# Patient Record
Sex: FEMALE | Race: WHITE | NOT HISPANIC OR LATINO | Employment: UNEMPLOYED | URBAN - METROPOLITAN AREA
[De-identification: names, ages, dates, MRNs, and addresses within clinical notes are randomized per-mention and may not be internally consistent; named-entity substitution may affect disease eponyms.]

---

## 2021-10-02 ENCOUNTER — HOSPITAL ENCOUNTER (OUTPATIENT)
Dept: MRI IMAGING | Facility: HOSPITAL | Age: 51
Discharge: HOME | End: 2021-10-02
Payer: COMMERCIAL

## 2021-10-02 DIAGNOSIS — M54.2 CERVICALGIA: ICD-10-CM

## 2021-10-02 DIAGNOSIS — M47.812 SPONDYLOSIS WITHOUT MYELOPATHY OR RADICULOPATHY, CERVICAL REGION: ICD-10-CM

## 2021-10-02 PROCEDURE — 72141 MRI NECK SPINE W/O DYE: CPT

## 2023-10-20 ENCOUNTER — OFFICE VISIT (OUTPATIENT)
Dept: GASTROENTEROLOGY | Facility: MEDICAL CENTER | Age: 53
End: 2023-10-20

## 2023-10-20 VITALS
WEIGHT: 143 LBS | SYSTOLIC BLOOD PRESSURE: 113 MMHG | DIASTOLIC BLOOD PRESSURE: 76 MMHG | HEIGHT: 65 IN | BODY MASS INDEX: 23.82 KG/M2 | HEART RATE: 71 BPM | TEMPERATURE: 98.2 F

## 2023-10-20 DIAGNOSIS — R09.A2 GLOBUS SENSATION: ICD-10-CM

## 2023-10-20 DIAGNOSIS — R05.3 CHRONIC COUGH: Primary | ICD-10-CM

## 2023-10-20 DIAGNOSIS — R76.8 HEPATITIS B CORE ANTIBODY POSITIVE: ICD-10-CM

## 2023-10-20 DIAGNOSIS — K21.9 GASTROESOPHAGEAL REFLUX DISEASE WITHOUT ESOPHAGITIS: ICD-10-CM

## 2023-10-20 DIAGNOSIS — K59.00 CONSTIPATION, UNSPECIFIED CONSTIPATION TYPE: ICD-10-CM

## 2023-10-20 PROBLEM — M50.30 DEGENERATIVE DISC DISEASE, CERVICAL: Status: ACTIVE | Noted: 2021-10-06

## 2023-10-20 RX ORDER — METHOCARBAMOL 750 MG/1
TABLET, FILM COATED ORAL
COMMUNITY
Start: 2023-08-04

## 2023-10-20 RX ORDER — PANTOPRAZOLE SODIUM 40 MG/1
40 TABLET, DELAYED RELEASE ORAL
Qty: 90 TABLET | Refills: 3 | Status: SHIPPED | OUTPATIENT
Start: 2023-10-20

## 2023-10-20 RX ORDER — FAMOTIDINE 40 MG/1
40 TABLET, FILM COATED ORAL
Qty: 90 TABLET | Refills: 3 | Status: SHIPPED | OUTPATIENT
Start: 2023-10-20

## 2023-10-20 RX ORDER — VALACYCLOVIR HYDROCHLORIDE 500 MG/1
TABLET, FILM COATED ORAL
COMMUNITY
Start: 2005-11-01

## 2023-10-20 RX ORDER — FLUTICASONE PROPIONATE AND SALMETEROL XINAFOATE 115; 21 UG/1; UG/1
2 AEROSOL, METERED RESPIRATORY (INHALATION) 2 TIMES DAILY
COMMUNITY
Start: 2023-08-05

## 2023-10-20 RX ORDER — ZOLPIDEM TARTRATE 6.25 MG/1
6.25 TABLET, FILM COATED, EXTENDED RELEASE ORAL
COMMUNITY

## 2023-10-20 RX ORDER — LANOLIN ALCOHOL/MO/W.PET/CERES
9 CREAM (GRAM) TOPICAL
COMMUNITY
Start: 2013-12-01

## 2023-10-20 RX ORDER — MONTELUKAST SODIUM 10 MG/1
TABLET ORAL
COMMUNITY

## 2023-10-20 RX ORDER — LORATADINE 10 MG/1
CAPSULE, LIQUID FILLED ORAL
COMMUNITY
Start: 2019-01-01

## 2023-10-20 RX ORDER — PANTOPRAZOLE SODIUM 40 MG/1
1 TABLET, DELAYED RELEASE ORAL DAILY
COMMUNITY
Start: 2023-05-22 | End: 2023-10-20 | Stop reason: SDUPTHER

## 2023-10-20 RX ORDER — GABAPENTIN 300 MG/1
CAPSULE ORAL
COMMUNITY
Start: 2023-10-16

## 2023-10-20 RX ORDER — ZOLMITRIPTAN 5 MG/1
1 SPRAY NASAL
COMMUNITY
Start: 2023-09-28

## 2023-10-20 NOTE — PATIENT INSTRUCTIONS
High Fiber Diet   AMBULATORY CARE:   A high-fiber diet  includes foods that have a high amount of fiber. Fiber is the part of fruits, vegetables, and grains that is not broken down by your body. Fiber keeps your bowel movements regular. Fiber can also help lower your cholesterol level, control blood sugar in people with diabetes, and relieve constipation. Fiber can also help you control your weight because it helps you feel full faster. Most adults should eat 25 to 35 grams of fiber each day. Talk to your dietitian or healthcare provider about the amount of fiber you need. Good sources of fiber:       Foods with at least 4 grams of fiber per serving:      ? to ½ cup of high-fiber cereal (check the nutrition label on the box)    ½ cup of blackberries or raspberries    4 dried prunes    1 cooked artichoke    ½ cup of cooked legumes, such as lentils, or red, kidney, and willis beans    Foods with 1 to 3 grams of fiber per servin slice of whole-wheat, pumpernickel, or rye bread    ½ cup of cooked brown rice    4 whole-wheat crackers    1 cup of oatmeal    ½ cup of cereal with 1 to 3 grams of fiber per serving (check the nutrition label on the box)    1 small piece of fruit, such as an apple, banana, pear, kiwi, or orange    3 dates    ½ cup of canned apricots, fruit cocktail, peaches, or pears    ½ cup of raw or cooked vegetables, such as carrots, cauliflower, cabbage, spinach, squash, or corn  Ways that you can increase fiber in your diet:   Choose brown or wild rice instead of white rice. Use whole wheat flour in recipes instead of white or all-purpose flour. Add beans and peas to casseroles or soups. Choose fresh fruit and vegetables with peels or skins on instead of juices. Other diet guidelines to follow: Add fiber to your diet slowly. You may have abdominal discomfort, bloating, and gas if you add fiber to your diet too quickly. Drink plenty of liquids as you add fiber to your diet. You may have nausea or develop constipation if you do not drink enough water. Ask how much liquid to drink each day and which liquids are best for you. © Copyright Emmanuel Gurrola 2023 Information is for End User's use only and may not be sold, redistributed or otherwise used for commercial purposes. The above information is an  only. It is not intended as medical advice for individual conditions or treatments. Talk to your doctor, nurse or pharmacist before following any medical regimen to see if it is safe and effective for you.

## 2023-10-20 NOTE — PROGRESS NOTES
[facility] - Outpatient Consultation  Monty Schilder 45 y.o. female MRN: [id number]  Encounter: [id number]          ASSESSMENT AND PLAN:  57-year-old female with history of GERD, migraine headaches, degenerative disc disease who presents for evaluation. 1. Chronic cough  2. Gastroesophageal reflux disease without esophagitis  3. Globus sensation  She reports symptoms of chronic cough, globus sensation, hoarseness, dysphagia guarding in February of this year. She was evaluated by [facility] and underwent EGD which was overall normal with gastric biopsies notable for gastritis negative for H. pylori. She has started on pantoprazole for the past 6 months and has improvement of her symptoms but they still persist.  She also reports heavy NSAID use due to degenerative joint disease and has discontinued NSAIDs 2 weeks ago. Her symptoms may be primarily laryngeal pharyngeal reflux and some classic GERD symptoms. We discussed treatment for gastroesophageal reflux including avoiding trigger foods, avoiding late night eating, sleeping with head of the bed elevated  Recommend she continue to avoid ibuprofen. She should continue pantoprazole 40 mg daily and start Pepcid 40 mg at night. We will continue this for 3 months. If she has ongoing symptoms consider twice daily Protonix if needed in the future. - famotidine (PEPCID) 40 MG tablet; Take 1 tablet (40 mg total) by mouth daily at bedtime  Dispense: 90 tablet; Refill: 3  - pantoprazole (PROTONIX) 40 mg tablet; Take 1 tablet (40 mg total) by mouth daily before breakfast  Dispense: 90 tablet; Refill: 3    4. Constipation, unspecified constipation type  She notes constipation with hard and difficult to pass bowel movements that time. I provided information regarding high-fiber diet, fiber supplementation with a goal of 25 g/day and adequate hydration.         5. Hepatitis B core antibody positive  She was previously found to have elevated liver enzymes in April of this year which have since normalized on recent blood work. Chronic hepatitis panel showed positive hepatitis B core antibody and surface antibody, negative surface antigen, indicating natural immunity. She reports that time 30 years ago with overt jaundice which resolved after a few days and this may have been her exposure to hepatitis B. She notes no risk factors for hepatitis B exposure including blood transfusion, recreational drug use, accidental needlesticks, tattoos, etc.      ______________________________________________________________________    HPI: 76-year-old female with history of GERD, migraine headaches, degenerative disc disease who presents for evaluation. She underwent EGD 8/2023 was normal gastric biopsy showed patchy mild chronic gastritis negative for H. pylori. She underwent a colonoscopy that dated as well showing internal hemorrhoids otherwise normal.  She was recommended repeat in 5 years. She reports in February of this year being diagnosed with pneumonia and bronchitis. She was treated with antibiotics and steroids. During this time she was having chronic cough, hoarseness, chest tightness. She was also given albuterol with minimal relief of her symptoms. She was ultimately started on Protonix in February and since then has noticed improvement of her cough but still has symptoms particularly at night. She has hoarseness of the voice that persists as well. She also notes a globus sensation. Previously had dysphagia but this has since resolved. She reports heavy use of ibuprofen up to 12 pills/day for many years due to her degenerative joint disease. She stopped ibuprofen 2 weeks ago. Her bowel movements are usually daily and formed but she reports significant straining at times. She reports low fiber in her diet. She denies rectal bleeding.     She reports no family history of colon cancer      4/2023 hepatitis B surface antigen negative, core antibody IgM negative, core antibody total positive, hepatitis B surface antibody positive  4/2023 liver enzymes show AST 45, ALT 54, total bilirubin 0.4 and alkaline phosphatase 65    9/2023 liver enzymes within normal limits, hemoglobin 13, ferritin 21    5/2023 right upper quadrant ultrasound was overall normal        REVIEW OF SYSTEMS:    CONSTITUTIONAL: Denies any fever, chills, rigors, and weight loss. HEENT: No earache or tinnitus. Denies hearing loss or visual disturbances. CARDIOVASCULAR: No chest pain or palpitations. RESPIRATORY: Denies any cough, hemoptysis, shortness of breath or dyspnea on exertion. GASTROINTESTINAL: As noted in the History of Present Illness. GENITOURINARY: No problems with urination. Denies any hematuria or dysuria. NEUROLOGIC: No dizziness or vertigo, denies headaches. MUSCULOSKELETAL: Denies any muscle or joint pain. SKIN: Denies skin rashes or itching. ENDOCRINE: Denies excessive thirst. Denies intolerance to heat or cold. PSYCHOSOCIAL: Denies depression or anxiety. Denies any recent memory loss. Historical Information   Past Medical History:   Diagnosis Date    Asthma 2023    Bronchitis 2023    GERD (gastroesophageal reflux disease)     Spine disorder 2023     History reviewed. No pertinent surgical history. Social History   Social History     Substance and Sexual Activity   Alcohol Use Not Currently     Social History     Substance and Sexual Activity   Drug Use Never     Social History     Tobacco Use   Smoking Status Never   Smokeless Tobacco Never     History reviewed. No pertinent family history.     Meds/Allergies       Current Outpatient Medications:     Advair -21 MCG/ACT inhaler    ALBUTEROL IN    aspirin-acetaminophen-caffeine (EXCEDRIN MIGRAINE) 250-250-65 MG per tablet    gabapentin (NEURONTIN) 300 mg capsule    Loratadine 10 MG CAPS    melatonin 3 mg    methocarbamol (ROBAXIN) 750 mg tablet    montelukast (SINGULAIR) 10 mg tablet Multiple Vitamins-Minerals (WOMENS MULTI GUMMIES PO)    pantoprazole (PROTONIX) 40 mg tablet    Povidone, PF, (iVIZIA Dry Eyes) 0.5 % SOLN    valACYclovir (VALTREX) 500 mg tablet    ZOLMitriptan (ZOMIG) 5 MG nasal solution    zolpidem (AMBIEN CR) 6.25 MG CR tablet    Allergies   Allergen Reactions    Meperidine Anaphylaxis and Hives    Duloxetine Abdominal Pain     Other reaction(s): many side effects    Hydrocodone Headache     Other reaction(s): Migraine    Mushroom Extract Complex - Food Allergy Abdominal Pain, GI Intolerance and Vomiting    Topiramate Other (See Comments)     Other reaction(s): Seizures    Venlafaxine Abdominal Pain     Other reaction(s): nose bleeds,repeat migraines, PVC, PAC's    Thiamine Rash     Other reaction(s): papular rash on face           Objective     Blood pressure 113/76, pulse 71, temperature 98.2 °F (36.8 °C), temperature source Tympanic, height 5' 5" (1.651 m), weight 64.9 kg (143 lb). Body mass index is 23.8 kg/m². PHYSICAL EXAM:      General Appearance:   Alert, cooperative, no distress   HEENT:   Normocephalic, atraumatic, anicteric. Neck:  Supple, symmetrical, trachea midline   Lungs:   Clear to auscultation bilaterally; no rales, rhonchi or wheezing; respirations unlabored    Heart[de-identified]   Regular rate and rhythm; no murmur, rub, or gallop. Abdomen:   Soft, non-tender, non-distended; normal bowel sounds; no masses, no organomegaly    Genitalia:   Deferred    Rectal:   Deferred    Extremities:  No cyanosis, clubbing or edema    Pulses:  2+ and symmetric    Skin:  No jaundice, rashes, or lesions    Lymph nodes:  No palpable cervical lymphadenopathy        Lab Results:   No visits with results within 1 Day(s) from this visit. Latest known visit with results is:   No results found for any previous visit. Radiology Results:   No results found.

## 2024-01-24 ENCOUNTER — OFFICE VISIT (OUTPATIENT)
Dept: GASTROENTEROLOGY | Facility: MEDICAL CENTER | Age: 54
End: 2024-01-24
Payer: COMMERCIAL

## 2024-01-24 VITALS
WEIGHT: 141 LBS | DIASTOLIC BLOOD PRESSURE: 73 MMHG | TEMPERATURE: 98.3 F | BODY MASS INDEX: 23.46 KG/M2 | SYSTOLIC BLOOD PRESSURE: 109 MMHG | HEART RATE: 94 BPM

## 2024-01-24 DIAGNOSIS — R05.3 CHRONIC COUGH: ICD-10-CM

## 2024-01-24 DIAGNOSIS — K21.9 LARYNGOPHARYNGEAL REFLUX (LPR): ICD-10-CM

## 2024-01-24 DIAGNOSIS — K21.9 GASTROESOPHAGEAL REFLUX DISEASE WITHOUT ESOPHAGITIS: Primary | ICD-10-CM

## 2024-01-24 DIAGNOSIS — R49.0 HOARSENESS: ICD-10-CM

## 2024-01-24 DIAGNOSIS — R09.A2 GLOBUS SENSATION: ICD-10-CM

## 2024-01-24 PROCEDURE — 99214 OFFICE O/P EST MOD 30 MIN: CPT | Performed by: INTERNAL MEDICINE

## 2024-01-24 RX ORDER — PANTOPRAZOLE SODIUM 40 MG/1
40 TABLET, DELAYED RELEASE ORAL
Qty: 180 TABLET | Refills: 3 | Status: SHIPPED | OUTPATIENT
Start: 2024-01-24

## 2024-01-24 RX ORDER — FAMOTIDINE 40 MG/1
40 TABLET, FILM COATED ORAL
Qty: 90 TABLET | Refills: 3 | Status: SHIPPED | OUTPATIENT
Start: 2024-01-24

## 2024-01-24 NOTE — PROGRESS NOTES
[facility] - Outpatient Follow-up Note  Beckie Barbour 53 y.o. female MRN: [id number]  Encounter: [id number]          ASSESSMENT AND PLAN:  52-year-old female with history of GERD, migraine headaches, degenerative disc disease who presents for follow-up evaluation.    1. Gastroesophageal reflux disease without esophagitis  2. Laryngopharyngeal reflux (LPR)  3. Globus sensation  4. Chronic cough  5. Hoarseness  She has a history of chronic throat clearing, globus sensation, hoarseness of the voice and epigastric burning.  Since being on pantoprazole 40 mg daily approximately 1 year her symptoms have significantly improved but still persist.  With addition of H2 blocker therapy at night she had only mild improvement of her symptoms.  I recommend an 8-week trial of twice daily pantoprazole be taken 30 minutes before breakfast and before dinner.    She is up-to-date on endoscopy performed at [facility] in August 2023 which was overall normal. If she has no significant improvement of her symptoms can consider repeat EGD as well. If she has no improvement on twice daily pantoprazole can consider transition to omeprazole or Dexilant.  She reports nasal symptoms and voice hoarseness and will be referred to ENT as well for further evaluation.  - pantoprazole (PROTONIX) 40 mg tablet; Take 1 tablet (40 mg total) by mouth 2 (two) times a day before meals  Dispense: 180 tablet; Refill: 3  - famotidine (PEPCID) 40 MG tablet; Take 1 tablet (40 mg total) by mouth daily at bedtime  Dispense: 90 tablet; Refill: 3  - Ambulatory Referral to Otolaryngology; Future    ______________________________________________________________________    SUBJECTIVE: 52-year-old female with history of GERD, migraine headaches, degenerative disc disease who presents for follow-up evaluation.    She was last seen in the GI office October 2023.  At that time she had chronic symptoms of cough, globus sensation, hoarseness and  dysphagia starting in February of this year.  She was evaluated by [facility] and underwent EGD which was overall normal and gastric biopsies were negative.  She was started on pantoprazole and had improvement of her symptoms but they persisted.  Her symptoms were thought to be related to GERD/LPR and she was recommended to continue daily PPI and add nighttime Pepcid 40 mg at night.    Interval history: She has been taking pantoprazole 40 mg in the morning and Pepcid 40 mg at night.  She has had slight improvement of her symptoms since adding the H2 blocker therapy but overall since starting pantoprazole a week ago her epigastric burning symptoms have completely resolved.  She continues to have intermittent globus sensation, hoarseness, throat clearing.  Symptoms can be worse after eating certain types of foods like yogurt.  She stopped her regular ibuprofen use and transition to Tylenol      1/2024 liver enzymes within normal limits, hemoglobin 12.7    Prior EGD/colonoscopy   EGD 8/2023 was normal gastric biopsy showed patchy mild chronic gastritis negative for H. pylori.  She underwent a colonoscopy that dated as well showing internal hemorrhoids otherwise normal.  She was recommended repeat in 5 years.     REVIEW OF SYSTEMS IS OTHERWISE NEGATIVE.  10 point review of systems is negative other than stated as per HPI    Historical Information   Past Medical History:   Diagnosis Date    Asthma 2023    Bronchitis 2023    GERD (gastroesophageal reflux disease)     Spine disorder 2023     No past surgical history on file.  Social History   Social History     Substance and Sexual Activity   Alcohol Use Not Currently     Social History     Substance and Sexual Activity   Drug Use Never     Social History     Tobacco Use   Smoking Status Never   Smokeless Tobacco Never     No family history on file.    Meds/Allergies       Current Outpatient Medications:     Advair -21 MCG/ACT inhaler    ALBUTEROL IN     aspirin-acetaminophen-caffeine (EXCEDRIN MIGRAINE) 250-250-65 MG per tablet    famotidine (PEPCID) 40 MG tablet    gabapentin (NEURONTIN) 300 mg capsule    Loratadine 10 MG CAPS    melatonin 3 mg    methocarbamol (ROBAXIN) 750 mg tablet    montelukast (SINGULAIR) 10 mg tablet    Multiple Vitamins-Minerals (WOMENS MULTI GUMMIES PO)    pantoprazole (PROTONIX) 40 mg tablet    Povidone, PF, (iVIZIA Dry Eyes) 0.5 % SOLN    valACYclovir (VALTREX) 500 mg tablet    ZOLMitriptan (ZOMIG) 5 MG nasal solution    zolpidem (AMBIEN CR) 6.25 MG CR tablet    Allergies   Allergen Reactions    Meperidine Anaphylaxis and Hives    Duloxetine Abdominal Pain     Other reaction(s): many side effects    Hydrocodone Headache     Other reaction(s): Migraine    Mushroom Extract Complex - Food Allergy Abdominal Pain, GI Intolerance and Vomiting    Topiramate Other (See Comments)     Other reaction(s): Seizures    Venlafaxine Abdominal Pain     Other reaction(s): nose bleeds,repeat migraines, PVC, PAC's    Thiamine Rash     Other reaction(s): papular rash on face           Objective     Blood pressure 109/73, pulse 94, temperature 98.3 °F (36.8 °C), weight 64 kg (141 lb). Body mass index is 23.46 kg/m².      PHYSICAL EXAM:      General Appearance:   Alert, cooperative, no distress   HEENT:   Normocephalic, atraumatic, anicteric.     Neck:  Supple, symmetrical, trachea midline   Lungs:   Clear to auscultation bilaterally; no rales, rhonchi or wheezing; respirations unlabored    Heart::   Regular rate and rhythm; no murmur, rub, or gallop.   Abdomen:   Soft, non-tender, non-distended; normal bowel sounds; no masses, no organomegaly    Genitalia:   Deferred    Rectal:   Deferred    Extremities:  No cyanosis, clubbing or edema    Pulses:  2+ and symmetric    Skin:  No jaundice, rashes, or lesions    Lymph nodes:  No palpable cervical lymphadenopathy        Lab Results:   No visits with results within 1 Day(s) from this visit.   Latest known visit  with results is:   No results found for any previous visit.         Radiology Results:   No results found.

## 2024-05-09 DIAGNOSIS — K21.9 GASTROESOPHAGEAL REFLUX DISEASE WITHOUT ESOPHAGITIS: Primary | ICD-10-CM

## 2024-05-09 RX ORDER — OMEPRAZOLE 40 MG/1
40 CAPSULE, DELAYED RELEASE ORAL
Qty: 180 CAPSULE | Refills: 3 | Status: SHIPPED | OUTPATIENT
Start: 2024-05-09

## 2024-06-28 ENCOUNTER — TELEPHONE (OUTPATIENT)
Dept: GASTROENTEROLOGY | Facility: MEDICAL CENTER | Age: 54
End: 2024-06-28

## 2024-06-28 ENCOUNTER — OFFICE VISIT (OUTPATIENT)
Dept: GASTROENTEROLOGY | Facility: MEDICAL CENTER | Age: 54
End: 2024-06-28
Payer: COMMERCIAL

## 2024-06-28 VITALS
TEMPERATURE: 98 F | HEIGHT: 65 IN | WEIGHT: 134.2 LBS | DIASTOLIC BLOOD PRESSURE: 72 MMHG | SYSTOLIC BLOOD PRESSURE: 106 MMHG | OXYGEN SATURATION: 95 % | HEART RATE: 75 BPM | BODY MASS INDEX: 22.36 KG/M2

## 2024-06-28 DIAGNOSIS — K21.9 GASTROESOPHAGEAL REFLUX DISEASE WITHOUT ESOPHAGITIS: Primary | ICD-10-CM

## 2024-06-28 DIAGNOSIS — R09.A2 GLOBUS SENSATION: ICD-10-CM

## 2024-06-28 DIAGNOSIS — R13.10 DYSPHAGIA, UNSPECIFIED TYPE: ICD-10-CM

## 2024-06-28 PROCEDURE — 99214 OFFICE O/P EST MOD 30 MIN: CPT | Performed by: INTERNAL MEDICINE

## 2024-06-28 NOTE — TELEPHONE ENCOUNTER
Procedure: EGD  Date: 09/19/2024  Physician performing: Dr. Jaiyeola  Location of procedure:  [place]  Instructions given to patient: EGD Prep  Diabetic: N/A  Clearances: N/A    Patient will call to schedule Manometry   After EGD and Manometry patient will schedule follow up

## 2024-06-28 NOTE — PROGRESS NOTES
[facility] - Outpatient Follow-up Note  Beckie Barbour 53 y.o. female MRN: [id number]  Encounter: [id number]          ASSESSMENT AND PLAN:  52-year-old female with history of GERD, migraine headaches, degenerative disc disease who presents for follow-up evaluation.      1. Gastroesophageal reflux disease without esophagitis  2. Globus sensation  3. Dysphagia, unspecified type  She has chronic symptoms of throat clearing, globus sensation, dysphagia.  She has been on PPI for at least 2 years, now with omeprazole 40 mg daily still with persistent symptoms.  She underwent EGD by an outside provider in August 2023 which was normal.  Given her persistent symptoms I recommend manometry, catheter-based 24-hour pH study with LPR study in addition to repeat EGD.  She will have estimated performed off PPI.  We discussed that if her symptoms show objective signs of acid reflux, options may include TIF versus antireflux surgery in the future.  I obtained informed consent from the patient. The risks/benefits/alternatives of the procedure were discussed with the patient. Risks included, but not limited to, infection, bleeding, perforation, injury to organs in the abdomen, missed lesion and incomplete procedure were discussed. Patient was agreeable and electronic signature was obtained.    - Esoph manometry/24hr ph; Future  - EGD; Future    Follow up after the procedures    ______________________________________________________________________    SUBJECTIVE:  52-year-old female with history of GERD, migraine headaches, degenerative disc disease who presents for follow-up evaluation.    She was last seen in the GI office in January 2024.  At that time she was being evaluated for chronic throat clearing, globus sensation, hoarseness, and epigastric burning.  In January she was taking pantoprazole 40 mg daily with improvement of her symptoms however they still persisted.  With addition of H2 blocker at  night she had only mild improvement of her symptoms.  She was recommended a trial of twice daily pantoprazole at that time.  He was also referred to ENT.    Interval history: She was seen by ENT in [place] and on bedside laryngoscopy found to have minimal inflammation in the postcricoid and laryngeal area and was recommended for allergy testing.  She states that she was diagnosed with hyper gammaglobulin anemia and was recommended to start IVIG infusions.  She had her IgG levels repeated by her pulmonologist and will await to follow-up with them and allergy regarding next steps.  She continues to have globus sensation, dysphagia, chronic throat clearing.  She is taking omeprazole 40 mg twice daily for approximately 6 weeks with little improvement of her symptoms.        Prior EGD/colonoscopy   EGD 8/2023 was normal gastric biopsy showed patchy mild chronic gastritis negative for H. pylori.  She underwent a colonoscopy that dated as well showing internal hemorrhoids otherwise normal.  She was recommended repeat in 5 years.      REVIEW OF SYSTEMS IS OTHERWISE NEGATIVE.  10 point review of systems is negative other than stated as per HPI    Historical Information   Past Medical History:   Diagnosis Date    Asthma 2023    Bronchitis 2023    GERD (gastroesophageal reflux disease)     Spine disorder 2023     History reviewed. No pertinent surgical history.  Social History   Social History     Substance and Sexual Activity   Alcohol Use Not Currently     Social History     Substance and Sexual Activity   Drug Use Never     Social History     Tobacco Use   Smoking Status Former    Current packs/day: 2.00    Average packs/day: 2.0 packs/day for 36.0 years (72.0 ttl pk-yrs)    Types: Cigarettes   Smokeless Tobacco Never     Family History   Problem Relation Age of Onset    Asthma Mother     Allergies Mother         cats    Colon polyps Father        Meds/Allergies       Current Outpatient Medications:     albuterol (ProAir  "HFA) 90 mcg/act inhaler    aspirin-acetaminophen-caffeine (EXCEDRIN MIGRAINE) 250-250-65 MG per tablet    Azelastine HCl 137 MCG/SPRAY SOLN    famotidine (PEPCID) 40 MG tablet    fexofenadine (ALLEGRA) 180 MG tablet    fluticasone (FLONASE) 50 mcg/act nasal spray    Fluticasone-Salmeterol (Wixela Inhub) 500-50 mcg/dose inhaler    gabapentin (NEURONTIN) 300 mg capsule    methocarbamol (ROBAXIN) 750 mg tablet    mometasone (ELOCON) 0.1 % ointment    montelukast (SINGULAIR) 10 mg tablet    Multiple Vitamins-Minerals (WOMENS MULTI GUMMIES PO)    mupirocin (BACTROBAN) 2 % ointment    omeprazole (PriLOSEC) 40 MG capsule    Povidone, PF, (iVIZIA Dry Eyes) 0.5 % SOLN    Spacer/Aero-Holding Chambers (Vortex Valved Holding Chamber) TAMI    valACYclovir (VALTREX) 500 mg tablet    ZOLMitriptan (ZOMIG) 5 MG nasal solution    zolpidem (AMBIEN CR) 6.25 MG CR tablet    Allergies   Allergen Reactions    Meperidine Anaphylaxis and Hives    Duloxetine Abdominal Pain     Other reaction(s): many side effects    Hydrocodone Headache     Other reaction(s): Migraine    Mushroom Extract Complex - Food Allergy Abdominal Pain, GI Intolerance and Vomiting    Topiramate Other (See Comments)     Other reaction(s): Seizures    Venlafaxine Abdominal Pain     Other reaction(s): nose bleeds,repeat migraines, PVC, PAC's    Thiamine Rash     Other reaction(s): papular rash on face    Thiamine Hcl Rash           Objective     Blood pressure 106/72, pulse 75, temperature 98 °F (36.7 °C), temperature source Tympanic, height 5' 5\" (1.651 m), weight 60.9 kg (134 lb 3.2 oz), SpO2 95%. Body mass index is 22.33 kg/m².      PHYSICAL EXAM:      General Appearance:   Alert, cooperative, no distress   HEENT:   Normocephalic, atraumatic, anicteric.     Neck:  Supple, symmetrical, trachea midline   Lungs:   Clear to auscultation bilaterally; no rales, rhonchi or wheezing; respirations unlabored    Heart::   Regular rate and rhythm; no murmur, rub, or gallop. "   Abdomen:   Soft, non-tender, non-distended; normal bowel sounds; no masses, no organomegaly    Genitalia:   Deferred    Rectal:   Deferred    Extremities:  No cyanosis, clubbing or edema    Pulses:  2+ and symmetric    Skin:  No jaundice, rashes, or lesions    Lymph nodes:  No palpable cervical lymphadenopathy        Lab Results:   No visits with results within 1 Day(s) from this visit.   Latest known visit with results is:   No results found for any previous visit.         Radiology Results:   No results found.    This note was completed in part utilizing Dragon Software. Grammatical errors, random word insertions, spelling mistakes, and incomplete sentences may be an occasional consequence of this system secondary to software limitations, ambient noise, and hardware issues. If you have any questions or concerns about the content, text, or information contained within the body of this dictation, please contact the provider for clarification.

## 2024-07-02 ENCOUNTER — TELEPHONE (OUTPATIENT)
Dept: GASTROENTEROLOGY | Facility: HOSPITAL | Age: 54
End: 2024-07-02

## 2024-07-15 ENCOUNTER — TELEPHONE (OUTPATIENT)
Dept: GASTROENTEROLOGY | Facility: HOSPITAL | Age: 54
End: 2024-07-15

## 2024-07-16 ENCOUNTER — HOSPITAL ENCOUNTER (OUTPATIENT)
Dept: GASTROENTEROLOGY | Facility: HOSPITAL | Age: 54
Discharge: HOME | End: 2024-07-16
Attending: INTERNAL MEDICINE
Payer: COMMERCIAL

## 2024-07-16 VITALS
OXYGEN SATURATION: 97 % | DIASTOLIC BLOOD PRESSURE: 67 MMHG | SYSTOLIC BLOOD PRESSURE: 138 MMHG | RESPIRATION RATE: 16 BRPM | HEART RATE: 68 BPM | TEMPERATURE: 98.2 F

## 2024-07-16 DIAGNOSIS — R09.A2 GLOBUS SENSATION: ICD-10-CM

## 2024-07-16 DIAGNOSIS — R13.10 DYSPHAGIA, UNSPECIFIED TYPE: ICD-10-CM

## 2024-07-16 DIAGNOSIS — K21.9 GASTROESOPHAGEAL REFLUX DISEASE WITHOUT ESOPHAGITIS: ICD-10-CM

## 2024-07-16 PROCEDURE — 91010 ESOPHAGUS MOTILITY STUDY: CPT

## 2024-07-16 PROCEDURE — 91038 ESOPH IMPED FUNCT TEST > 1HR: CPT

## 2024-07-16 NOTE — PERIOPERATIVE NURSING NOTE
Patient brought in the room and explained the esophageal manometry procedure. After the confirmation of allergies, Lidocaine 2% viscous solution given via right/ left nostrils and  a transnasal insertion of the High Resolution esophageal manometry catheter was inserted via left nostril. Patient given water to drink during the insertion and once the catheter inserted pressure bands of both Upper esophageal sphincter  (UES) and Lower esophageal sphincter ( LES) were observed on the color contour. Patient instructed to take a deep breath to verify placement of the catheter, diaphragmatic pinch noted on inspiration. Catheter was secured to left cheek. Patient was assisted to supine position .Patient was instructed to relax  while acclimating the catheter for about 5 minutes. A 30 second baseline resting pressure was obtained to identify the UES and LES followed by a series of 10 liquid swallows using 5 cc room temperature normal saline to assess esophageal motility and bolus transit. Patient administered 10 viscous swallows using 5 cc viscous solution, 1 multiple rapid drink swallow using 2 cc room temperature normal saline given a total of 5 drinks which patient struggled. Patient began to cough up and she was assisted to upright position and she started coughing -harsh . Patient reporting the need to expectorate thick whitish secretion.  Patient did self administered her own albuterol inhaler with improvement.   A  second attempt was done with total of 4 drinks each swallow using 1 cc room temperature normal saline for the multiple rapid drink swallow. Patient instructed to sit up at the edge of the stretcher and given 5 upright liquid swallows using 5 cc room temperature normal saline and 1 rapid drink challenge using 50 cc room temperature water. At the end of the procedure the high resolution esophageal manometry catheter was removed from the nostril intact. Dual sensor PH probe inserted via leftnostril and  secured. Zephr recorder teachback performed and patient verbalized understanding. Patient instructed to return next day to have probe remove. Discharge instructions given and patient ambulated out of room in stable condition.

## 2024-07-23 ENCOUNTER — TELEPHONE (OUTPATIENT)
Dept: GASTROENTEROLOGY | Facility: MEDICAL CENTER | Age: 54
End: 2024-07-23

## 2024-07-23 PROCEDURE — 91038 ESOPH IMPED FUNCT TEST > 1HR: CPT | Performed by: INTERNAL MEDICINE

## 2024-07-23 NOTE — TELEPHONE ENCOUNTER
Tried to get earlier date for patient but unsuccessful so placed her on high priority waiting list per provider        Message  Received: Today  Diana M Jaiyeola, MD  [facility]  This patient is scheduled for a visit with me in September, but please see if there is a sooner available appointment, if not she can be placed on a high priority cancellation list Thanks

## 2024-08-20 ENCOUNTER — TELEPHONE (OUTPATIENT)
Dept: GASTROENTEROLOGY | Facility: CLINIC | Age: 54
End: 2024-08-20

## 2024-08-20 NOTE — TELEPHONE ENCOUNTER
Left voicemail and requested call back     Called patient to confirm procedure for 09/19/2024 with Dr. Jaiyeola please give us a call to confirm

## 2024-09-03 ENCOUNTER — TELEPHONE (OUTPATIENT)
Dept: GASTROENTEROLOGY | Facility: MEDICAL CENTER | Age: 54
End: 2024-09-03

## 2024-09-03 NOTE — TELEPHONE ENCOUNTER
Called patient to confirm procedure for 09/19/2024 with Dr. Jaiyeola, Patient has confirmed. Informed patient the lab will call two days prior with the arrival time

## 2024-09-04 ENCOUNTER — ANESTHESIA EVENT (OUTPATIENT)
Dept: ANESTHESIOLOGY | Facility: HOSPITAL | Age: 54
End: 2024-09-04

## 2024-09-04 ENCOUNTER — ANESTHESIA (OUTPATIENT)
Dept: ANESTHESIOLOGY | Facility: HOSPITAL | Age: 54
End: 2024-09-04

## 2024-09-19 ENCOUNTER — ANESTHESIA EVENT (OUTPATIENT)
Dept: GASTROENTEROLOGY | Facility: MEDICAL CENTER | Age: 54
End: 2024-09-19
Payer: COMMERCIAL

## 2024-09-19 ENCOUNTER — ANESTHESIA (OUTPATIENT)
Dept: GASTROENTEROLOGY | Facility: MEDICAL CENTER | Age: 54
End: 2024-09-19
Payer: COMMERCIAL

## 2024-09-19 ENCOUNTER — HOSPITAL ENCOUNTER (OUTPATIENT)
Dept: GASTROENTEROLOGY | Facility: MEDICAL CENTER | Age: 54
Setting detail: OUTPATIENT SURGERY
End: 2024-09-19
Payer: COMMERCIAL

## 2024-09-19 VITALS
BODY MASS INDEX: 22.33 KG/M2 | RESPIRATION RATE: 16 BRPM | HEART RATE: 71 BPM | TEMPERATURE: 98.1 F | HEIGHT: 65 IN | OXYGEN SATURATION: 99 % | WEIGHT: 134 LBS | DIASTOLIC BLOOD PRESSURE: 71 MMHG | SYSTOLIC BLOOD PRESSURE: 123 MMHG

## 2024-09-19 DIAGNOSIS — R09.A2 GLOBUS SENSATION: ICD-10-CM

## 2024-09-19 DIAGNOSIS — K21.9 GASTROESOPHAGEAL REFLUX DISEASE WITHOUT ESOPHAGITIS: ICD-10-CM

## 2024-09-19 PROCEDURE — 88305 TISSUE EXAM BY PATHOLOGIST: CPT | Performed by: PATHOLOGY

## 2024-09-19 PROCEDURE — 43239 EGD BIOPSY SINGLE/MULTIPLE: CPT | Performed by: INTERNAL MEDICINE

## 2024-09-19 RX ORDER — PROPOFOL 10 MG/ML
INJECTION, EMULSION INTRAVENOUS AS NEEDED
Status: DISCONTINUED | OUTPATIENT
Start: 2024-09-19 | End: 2024-09-19

## 2024-09-19 RX ORDER — SODIUM CHLORIDE 9 MG/ML
125 INJECTION, SOLUTION INTRAVENOUS CONTINUOUS
Status: CANCELLED | OUTPATIENT
Start: 2024-09-19

## 2024-09-19 RX ORDER — SODIUM CHLORIDE 9 MG/ML
125 INJECTION, SOLUTION INTRAVENOUS CONTINUOUS
Status: DISCONTINUED | OUTPATIENT
Start: 2024-09-19 | End: 2024-09-23 | Stop reason: HOSPADM

## 2024-09-19 RX ORDER — OMEPRAZOLE 40 MG/1
40 CAPSULE, DELAYED RELEASE ORAL
Qty: 180 CAPSULE | Refills: 3 | Status: SHIPPED | OUTPATIENT
Start: 2024-09-19

## 2024-09-19 RX ORDER — LIDOCAINE HYDROCHLORIDE 20 MG/ML
INJECTION, SOLUTION EPIDURAL; INFILTRATION; INTRACAUDAL; PERINEURAL AS NEEDED
Status: DISCONTINUED | OUTPATIENT
Start: 2024-09-19 | End: 2024-09-19

## 2024-09-19 RX ADMIN — SODIUM CHLORIDE 125 ML/HR: 0.9 INJECTION, SOLUTION INTRAVENOUS at 11:38

## 2024-09-19 RX ADMIN — PROPOFOL 130 MG: 10 INJECTION, EMULSION INTRAVENOUS at 11:48

## 2024-09-19 RX ADMIN — LIDOCAINE HYDROCHLORIDE 100 MG: 20 INJECTION, SOLUTION EPIDURAL; INFILTRATION; INTRACAUDAL at 11:48

## 2024-09-19 NOTE — ANESTHESIA PREPROCEDURE EVALUATION
Procedure:  EGD    Relevant Problems   CARDIO   (+) Migraine      GI/HEPATIC   (+) Gastroesophageal reflux disease without esophagitis      MUSCULOSKELETAL   (+) Degenerative disc disease, cervical      NEURO/PSYCH   (+) Dysthymic disorder   (+) Migraine   (+) Posttraumatic stress disorder      PULMONARY   (+) Asthma             Anesthesia Plan  ASA Score- 2     Anesthesia Type- IV sedation with anesthesia with ASA Monitors.         Additional Monitors:     Airway Plan:            Plan Factors-    Induction- intravenous.    Postoperative Plan-         Informed Consent- Anesthetic plan and risks discussed with patient.  I personally reviewed this patient with the CRNA. Discussed and agreed on the Anesthesia Plan with the CRNA..

## 2024-09-19 NOTE — H&P
H&P - Gastroenterology   Name: Beckie Barbour 53 y.o. female I MRN: [id number]  Unit/Bed#:  I Date of Admission: 9/19/2024   Date of Service: 9/19/2024 I Hospital Day: 0     Assessment & Plan   This is a 53 y.o. year old female here for egd, and she is stable and optimized for her procedure.    History of Present Illness    Beckie Barbour is a 53 y.o. year old female who presents for globus sensation, gerd    REVIEW OF SYSTEMS: Per the HPI, and otherwise unremarkable.    Historical Information   Past Medical History:   Diagnosis Date    Asthma 2023    Bronchitis 2023    GERD (gastroesophageal reflux disease)     Spine disorder 2023     No past surgical history on file.  Social History     Tobacco Use    Smoking status: Former     Current packs/day: 2.00     Average packs/day: 2.0 packs/day for 36.0 years (72.0 ttl pk-yrs)     Types: Cigarettes    Smokeless tobacco: Never   Vaping Use    Vaping status: Never Used   Substance and Sexual Activity    Alcohol use: Not Currently    Drug use: Never    Sexual activity: Not on file     E-Cigarette/Vaping    E-Cigarette Use Never User      E-Cigarette/Vaping Substances    Nicotine No     THC No     CBD No     Flavoring No     Other No     Unknown No      Family history non-contributory    Meds/Allergies     Current Outpatient Medications:     aspirin-acetaminophen-caffeine (EXCEDRIN MIGRAINE) 250-250-65 MG per tablet    famotidine (PEPCID) 40 MG tablet    fluticasone (FLONASE) 50 mcg/act nasal spray    gabapentin (NEURONTIN) 300 mg capsule    methocarbamol (ROBAXIN) 750 mg tablet    Multiple Vitamins-Minerals (WOMENS MULTI GUMMIES PO)    omeprazole (PriLOSEC) 40 MG capsule    ZOLMitriptan (ZOMIG) 5 MG nasal solution    albuterol (ProAir HFA) 90 mcg/act inhaler    Azelastine HCl 137 MCG/SPRAY SOLN    fexofenadine (ALLEGRA) 180 MG tablet    Fluticasone-Salmeterol (Wixela Inhub) 500-50 mcg/dose inhaler    mometasone (ELOCON) 0.1 % ointment    montelukast (SINGULAIR) 10 mg  tablet    mupirocin (BACTROBAN) 2 % ointment    Povidone, PF, (iVIZIA Dry Eyes) 0.5 % SOLN    Spacer/Aero-Holding Chambers (Vortex Valved Holding Chamber) TAMI    valACYclovir (VALTREX) 500 mg tablet    zolpidem (AMBIEN CR) 6.25 MG CR tablet    Current Facility-Administered Medications:     sodium chloride 0.9 % infusion, 125 mL/hr, Intravenous, Continuous  Allergies   Allergen Reactions    Meperidine Anaphylaxis and Hives    Duloxetine Abdominal Pain     Other reaction(s): many side effects    Hydrocodone Headache     Other reaction(s): Migraine    Mushroom Extract Complex - Food Allergy Abdominal Pain, GI Intolerance and Vomiting    Topiramate Other (See Comments)     Other reaction(s): Seizures    Venlafaxine Abdominal Pain     Other reaction(s): nose bleeds,repeat migraines, PVC, PAC's    Thiamine Rash     Other reaction(s): papular rash on face    Thiamine Hcl Rash       Objective   There were no vitals taken for this visit.    Physical Exam  Gen: NAD  Head: NCAT  CV: RRR  CHEST: Clear  ABD: soft, NT/ND  EXT: no edema

## 2024-12-24 DIAGNOSIS — K21.9 GASTROESOPHAGEAL REFLUX DISEASE WITHOUT ESOPHAGITIS: ICD-10-CM

## 2024-12-24 DIAGNOSIS — R05.3 CHRONIC COUGH: ICD-10-CM

## 2024-12-24 DIAGNOSIS — R09.A2 GLOBUS SENSATION: ICD-10-CM

## 2024-12-24 RX ORDER — FAMOTIDINE 40 MG/1
40 TABLET, FILM COATED ORAL
Qty: 90 TABLET | Refills: 1 | Status: SHIPPED | OUTPATIENT
Start: 2024-12-24

## 2025-06-18 DIAGNOSIS — R05.3 CHRONIC COUGH: ICD-10-CM

## 2025-06-18 DIAGNOSIS — R09.A2 GLOBUS SENSATION: ICD-10-CM

## 2025-06-18 DIAGNOSIS — K21.9 GASTROESOPHAGEAL REFLUX DISEASE WITHOUT ESOPHAGITIS: ICD-10-CM

## 2025-06-18 RX ORDER — FAMOTIDINE 40 MG/1
40 TABLET, FILM COATED ORAL
Qty: 90 TABLET | Refills: 1 | Status: SHIPPED | OUTPATIENT
Start: 2025-06-18

## 2025-08-12 ENCOUNTER — OFFICE VISIT (OUTPATIENT)
Dept: OBGYN CLINIC | Facility: CLINIC | Age: 55
End: 2025-08-12
Payer: COMMERCIAL